# Patient Record
Sex: FEMALE | Race: WHITE | URBAN - METROPOLITAN AREA
[De-identification: names, ages, dates, MRNs, and addresses within clinical notes are randomized per-mention and may not be internally consistent; named-entity substitution may affect disease eponyms.]

---

## 2024-06-07 ENCOUNTER — OFFICE VISIT (OUTPATIENT)
Age: 24
End: 2024-06-07

## 2024-06-07 VITALS
SYSTOLIC BLOOD PRESSURE: 122 MMHG | DIASTOLIC BLOOD PRESSURE: 81 MMHG | HEART RATE: 71 BPM | HEIGHT: 64 IN | TEMPERATURE: 98.2 F | OXYGEN SATURATION: 98 %

## 2024-06-07 DIAGNOSIS — S81.811A LACERATION OF RIGHT LOWER EXTREMITY, INITIAL ENCOUNTER: Primary | ICD-10-CM
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
## 2024-06-07 RX ORDER — ESCITALOPRAM OXALATE 5 MG/1
5 TABLET ORAL DAILY
COMMUNITY

## 2024-06-07 RX ORDER — AMOXICILLIN AND CLAVULANATE POTASSIUM 875; 125 MG/1; MG/1
1 TABLET, FILM COATED ORAL 2 TIMES DAILY
Qty: 14 TABLET | Refills: 0 | Status: SHIPPED | OUTPATIENT
Start: 2024-06-07 | End: 2024-06-14

## 2024-06-07 ASSESSMENT — ENCOUNTER SYMPTOMS
SHORTNESS OF BREATH: 0
CONSTIPATION: 0
ABDOMINAL PAIN: 0
NAUSEA: 0
DIARRHEA: 0
CHEST TIGHTNESS: 0
VOMITING: 0
COUGH: 0

## 2024-06-07 NOTE — PROGRESS NOTES
Kaia Hickman (:  2000) is a 23 y.o. female,New patient, here for evaluation of the following chief complaint(s):  Animal Bite (Dog bit today 4 hours, dog bit right thigh and wouldn't let go)      ASSESSMENT/PLAN:    ICD-10-CM    1. Laceration of right lower extremity, initial encounter  S81.811A amoxicillin-clavulanate (AUGMENTIN) 875-125 MG per tablet      2. Dog bite, initial encounter  W54.0XXA Tdap, BOOSTRIX, (age 10 yrs+), IM     amoxicillin-clavulanate (AUGMENTIN) 875-125 MG per tablet          Patient presents for dog bite  Tetanus was updated here.   Wound was copiously irrigated with soap and water and then normal saline.   Applied nonadherent dressing and educated on signs of infection.   RX amoxicillin.     SUBJECTIVE/OBJECTIVE:    History provided by:  Patient   used: No      HPI:   23 y.o. female presents for a dog bite. She states that she was at work today when a dog bit her. Does not remember last tetanus. She states that she is able to contact dog owners to verify immunizations of the dog. She states that she did call EMS who advised to keep the area clean. She states that she went home took a shower and tried to irrigate the area in the shower but had continued bleeding and came here for evaluation. Does not believe she is pregnant. Is not on blood thinners.       Vitals:    24 1809   BP: 122/81   Site: Right Upper Arm   Position: Sitting   Cuff Size: Large Adult   Pulse: 71   Temp: 98.2 °F (36.8 °C)   TempSrc: Oral   SpO2: 98%   Height: 1.626 m (5' 4\")       Review of Systems   Constitutional:  Negative for fatigue and fever.   Respiratory:  Negative for cough, chest tightness and shortness of breath.    Cardiovascular:  Negative for chest pain.   Gastrointestinal:  Negative for abdominal pain, constipation, diarrhea, nausea and vomiting.   Musculoskeletal:  Negative for neck pain and neck stiffness.   Skin:  Positive for wound.       Physical Exam  Vitals and

## 2024-06-10 ENCOUNTER — HOSPITAL ENCOUNTER (EMERGENCY)
Age: 24
Discharge: HOME | End: 2024-06-11
Payer: COMMERCIAL

## 2024-06-10 DIAGNOSIS — S71.151D: Primary | ICD-10-CM
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
## 2024-06-10 PROCEDURE — 99282 EMERGENCY DEPT VISIT SF MDM: CPT

## 2024-06-10 ASSESSMENT — PAIN SCALES - GENERAL: PAINLEVEL_OUTOF10: 0

## 2024-06-11 VITALS
RESPIRATION RATE: 19 BRPM | BODY MASS INDEX: 30.27 KG/M2 | TEMPERATURE: 97.9 F | WEIGHT: 176.37 LBS | SYSTOLIC BLOOD PRESSURE: 110 MMHG | OXYGEN SATURATION: 99 % | DIASTOLIC BLOOD PRESSURE: 72 MMHG | HEART RATE: 76 BPM

## 2024-06-11 ASSESSMENT — PAIN SCALES - GENERAL: PAINLEVEL_OUTOF10: 0

## 2024-06-11 NOTE — DISCHARGE INSTRUCTIONS
Okay to do warm compresses at this time, may use Neosporin and bandaging if tolerated.  Continue to monitor for improvement.  Follow-up outpatient with occupational health provider for further care and treatment if needed.  Return to the ER for any emergency worsening or concern.

## 2024-06-11 NOTE — ED PROVIDER NOTES
**ADVANCED PRACTICE PROVIDER, I HAVE EVALUATED THIS PATIENT**        [facility] EMERGENCY DEPARTMENT  EMERGENCY DEPARTMENT ENCOUNTER      Pt Name: Kaia Hickman  MRN:[id number]  Birthdate 2000  Date of evaluation: 6/10/2024  Provider: Murali Lobo PA-C  Note Started: 12:34 AM EDT 6/11/24        Chief Complaint:    Chief Complaint   Patient presents with    Animal Bite     Pt reports getting bit by dog \"Friday\" causing laceration to right thigh. Pt states that she went to urgent care and was given a tetanus shot and abx. Pt endorses numbness to lower bite \"since Saturday\". Pt alert and oriented at this time with no signs of distress noted. Pt denies any pain at rest.         Nursing Notes, Past Medical Hx, Past Surgical Hx, Social Hx, Allergies, and Family Hx were all reviewed and agreed with or any disagreements were addressed in the HPI.    HPI: (Location, Duration, Timing, Severity, Quality, Assoc Sx, Context, Modifying factors)    History From: Patient          Chief Complaint of dog bite last week on right thigh    This is a  23 y.o. female who presents indicating that she was attacked by a dog while doing her job as a  last week.  She suffered a wound areas to the anterior proximal right thigh as well as the medial posterior aspect of the right thigh.  States that she did get care for this last week on the seventh when it occurred.  She has been on antibiotics since.  Seems to be healing well.  However she still has quite a bit of feeling of fullness in the skin around the wounds as well as bruising.  Also it feels numb where the punctures are as opposed to hurting like she thought that it probably should.  Finally decided this evening that she should come in for further care and treatment to the ER.    PastMedical/Surgical History:  History reviewed. No pertinent past medical history.  History reviewed. No pertinent surgical history.    Medications:  Previous Medications

## 2024-06-19 ENCOUNTER — HOSPITAL ENCOUNTER (EMERGENCY)
Age: 24
Discharge: HOME | End: 2024-06-19
Attending: STUDENT IN AN ORGANIZED HEALTH CARE EDUCATION/TRAINING PROGRAM
Payer: COMMERCIAL

## 2024-06-19 ENCOUNTER — APPOINTMENT (OUTPATIENT)
Dept: GENERAL RADIOLOGY | Age: 24
End: 2024-06-19
Payer: COMMERCIAL

## 2024-06-19 VITALS
RESPIRATION RATE: 16 BRPM | TEMPERATURE: 98.8 F | OXYGEN SATURATION: 98 % | WEIGHT: 170.42 LBS | HEART RATE: 75 BPM | BODY MASS INDEX: 29.09 KG/M2 | HEIGHT: 64 IN | SYSTOLIC BLOOD PRESSURE: 95 MMHG | DIASTOLIC BLOOD PRESSURE: 63 MMHG

## 2024-06-19 DIAGNOSIS — R50.9 FEVER, UNSPECIFIED FEVER CAUSE: Primary | ICD-10-CM

## 2024-06-19 DIAGNOSIS — R51.9 NONINTRACTABLE HEADACHE, UNSPECIFIED CHRONICITY PATTERN, UNSPECIFIED HEADACHE TYPE: ICD-10-CM

## 2024-06-19 LAB
ALBUMIN SERPL-MCNC: 4.3 G/DL (ref 3.4–5)
ALBUMIN/GLOB SERPL: 1.4 {RATIO} (ref 1.1–2.2)
ALP SERPL-CCNC: 70 U/L (ref 40–129)
ALT SERPL-CCNC: 22 U/L (ref 10–40)
ANION GAP SERPL CALCULATED.3IONS-SCNC: 11 MMOL/L (ref 3–16)
AST SERPL-CCNC: 21 U/L (ref 15–37)
BASOPHILS # BLD: 0 K/UL (ref 0–0.2)
BASOPHILS NFR BLD: 0.5 %
BILIRUB SERPL-MCNC: 0.3 MG/DL (ref 0–1)
BILIRUB UR QL STRIP.AUTO: NEGATIVE
BUN SERPL-MCNC: 8 MG/DL (ref 7–20)
CALCIUM SERPL-MCNC: 8.4 MG/DL (ref 8.3–10.6)
CHLORIDE SERPL-SCNC: 101 MMOL/L (ref 99–110)
CLARITY UR: CLEAR
CO2 SERPL-SCNC: 23 MMOL/L (ref 21–32)
COLOR UR: YELLOW
CREAT SERPL-MCNC: 0.6 MG/DL (ref 0.6–1.1)
DEPRECATED RDW RBC AUTO: 13.6 % (ref 12.4–15.4)
EOSINOPHIL # BLD: 0 K/UL (ref 0–0.6)
EOSINOPHIL NFR BLD: 0.7 %
FLUAV RNA UPPER RESP QL NAA+PROBE: NEGATIVE
FLUBV AG NPH QL: NEGATIVE
GFR SERPLBLD CREATININE-BSD FMLA CKD-EPI: >90 ML/MIN/{1.73_M2}
GLUCOSE SERPL-MCNC: 74 MG/DL (ref 70–99)
GLUCOSE UR STRIP.AUTO-MCNC: NEGATIVE MG/DL
HCG SERPL QL: NEGATIVE
HCT VFR BLD AUTO: 37.3 % (ref 36–48)
HETEROPH AB BLD QL IA: NEGATIVE
HGB BLD-MCNC: 12.8 G/DL (ref 12–16)
HGB UR QL STRIP.AUTO: NEGATIVE
KETONES UR STRIP.AUTO-MCNC: NEGATIVE MG/DL
LACTATE BLDV-SCNC: 0.6 MMOL/L (ref 0.4–2)
LEUKOCYTE ESTERASE UR QL STRIP.AUTO: NEGATIVE
LYMPHOCYTES # BLD: 1 K/UL (ref 1–5.1)
LYMPHOCYTES NFR BLD: 17.2 %
MCH RBC QN AUTO: 31.6 PG (ref 26–34)
MCHC RBC AUTO-ENTMCNC: 34.2 G/DL (ref 31–36)
MCV RBC AUTO: 92.3 FL (ref 80–100)
MONOCYTES # BLD: 0.7 K/UL (ref 0–1.3)
MONOCYTES NFR BLD: 11.6 %
NEUTROPHILS # BLD: 4.3 K/UL (ref 1.7–7.7)
NEUTROPHILS NFR BLD: 70 %
NITRITE UR QL STRIP.AUTO: NEGATIVE
PH UR STRIP.AUTO: 7.5 [PH] (ref 5–8)
PLATELET # BLD AUTO: 262 K/UL (ref 135–450)
PMV BLD AUTO: 8.5 FL (ref 5–10.5)
POTASSIUM SERPL-SCNC: 3.8 MMOL/L (ref 3.5–5.1)
PROT SERPL-MCNC: 7.4 G/DL (ref 6.4–8.2)
PROT UR STRIP.AUTO-MCNC: NEGATIVE MG/DL
RBC # BLD AUTO: 4.04 M/UL (ref 4–5.2)
REPORT: NORMAL
RESP PATH DNA+RNA PNL NPH NAA+NON-PROBE: NORMAL
S PYO AG THROAT QL: NEGATIVE
SARS-COV-2 RDRP RESP QL NAA+PROBE: NOT DETECTED
SODIUM SERPL-SCNC: 135 MMOL/L (ref 136–145)
SP GR UR STRIP.AUTO: 1.01 (ref 1–1.03)
UA COMPLETE W REFLEX CULTURE PNL UR: NORMAL
UA DIPSTICK W REFLEX MICRO PNL UR: NORMAL
URN SPEC COLLECT METH UR: NORMAL
UROBILINOGEN UR STRIP-ACNC: 0.2 E.U./DL
WBC # BLD AUTO: 6.1 K/UL (ref 4–11)

## 2024-06-19 PROCEDURE — 85025 COMPLETE CBC W/AUTO DIFF WBC: CPT

## 2024-06-19 PROCEDURE — 81003 URINALYSIS AUTO W/O SCOPE: CPT

## 2024-06-19 PROCEDURE — 71045 X-RAY EXAM CHEST 1 VIEW: CPT

## 2024-06-19 PROCEDURE — 0202U NFCT DS 22 TRGT SARS-COV-2: CPT

## 2024-06-19 PROCEDURE — 99284 EMERGENCY DEPT VISIT MOD MDM: CPT

## 2024-06-19 PROCEDURE — 80053 COMPREHEN METABOLIC PANEL: CPT

## 2024-06-19 PROCEDURE — 83605 ASSAY OF LACTIC ACID: CPT

## 2024-06-19 PROCEDURE — 6370000000 HC RX 637 (ALT 250 FOR IP): Performed by: PHYSICIAN ASSISTANT

## 2024-06-19 PROCEDURE — 87635 SARS-COV-2 COVID-19 AMP PRB: CPT

## 2024-06-19 PROCEDURE — 86308 HETEROPHILE ANTIBODY SCREEN: CPT

## 2024-06-19 PROCEDURE — 87880 STREP A ASSAY W/OPTIC: CPT

## 2024-06-19 PROCEDURE — 87804 INFLUENZA ASSAY W/OPTIC: CPT

## 2024-06-19 PROCEDURE — 2580000003 HC RX 258: Performed by: PHYSICIAN ASSISTANT

## 2024-06-19 PROCEDURE — 87081 CULTURE SCREEN ONLY: CPT

## 2024-06-19 PROCEDURE — 84703 CHORIONIC GONADOTROPIN ASSAY: CPT

## 2024-06-19 RX ORDER — IBUPROFEN 600 MG/1
600 TABLET ORAL EVERY 6 HOURS PRN
Qty: 30 TABLET | Refills: 0 | Status: SHIPPED | OUTPATIENT
Start: 2024-06-19

## 2024-06-19 RX ORDER — ACETAMINOPHEN 500 MG
1000 TABLET ORAL
Status: COMPLETED | OUTPATIENT
Start: 2024-06-19 | End: 2024-06-19

## 2024-06-19 RX ORDER — ACETAMINOPHEN 325 MG/1
650 TABLET ORAL EVERY 6 HOURS PRN
Qty: 30 TABLET | Refills: 0 | Status: SHIPPED | OUTPATIENT
Start: 2024-06-19

## 2024-06-19 RX ORDER — SODIUM CHLORIDE, SODIUM LACTATE, POTASSIUM CHLORIDE, AND CALCIUM CHLORIDE .6; .31; .03; .02 G/100ML; G/100ML; G/100ML; G/100ML
1000 INJECTION, SOLUTION INTRAVENOUS ONCE
Status: COMPLETED | OUTPATIENT
Start: 2024-06-19 | End: 2024-06-19

## 2024-06-19 RX ADMIN — SODIUM CHLORIDE, POTASSIUM CHLORIDE, SODIUM LACTATE AND CALCIUM CHLORIDE 1000 ML: 600; 310; 30; 20 INJECTION, SOLUTION INTRAVENOUS at 11:16

## 2024-06-19 RX ADMIN — ACETAMINOPHEN 1000 MG: 500 TABLET ORAL at 11:09

## 2024-06-19 ASSESSMENT — LIFESTYLE VARIABLES
HOW MANY STANDARD DRINKS CONTAINING ALCOHOL DO YOU HAVE ON A TYPICAL DAY: 1 OR 2
HOW OFTEN DO YOU HAVE A DRINK CONTAINING ALCOHOL: MONTHLY OR LESS

## 2024-06-19 ASSESSMENT — PAIN SCALES - GENERAL
PAINLEVEL_OUTOF10: 4
PAINLEVEL_OUTOF10: 7

## 2024-06-19 ASSESSMENT — PAIN DESCRIPTION - DESCRIPTORS: DESCRIPTORS: BURNING;SHARP

## 2024-06-19 ASSESSMENT — PAIN DESCRIPTION - LOCATION
LOCATION: HEAD
LOCATION: HEAD

## 2024-06-19 ASSESSMENT — PAIN - FUNCTIONAL ASSESSMENT: PAIN_FUNCTIONAL_ASSESSMENT: 0-10

## 2024-06-19 NOTE — ED PROVIDER NOTES
[facility] EMERGENCY DEPARTMENT  EMERGENCY DEPARTMENT ENCOUNTER        Pt Name: Kaia Hickman  MRN: [id number]  Birthdate 2000  Date of evaluation: 6/19/2024  Provider: Alfredo Mcclain PA-C  PCP: No primary care provider on file.  Note Started: 10:44 AM EDT 6/19/24       I have seen and evaluated this patient with my supervising physician No att. providers found.      CHIEF COMPLAINT       Chief Complaint   Patient presents with    Animal Bite     Pt to ED from home c/o dog bite to upper right thigh that occurred on 6/7/24. Pt states \"I started to develop a fever yesterday and my throat and head feel like it is on fire. I also was having chills last night too\" pt rates pain in head 4/10       HISTORY OF PRESENT ILLNESS: 1 or more Elements     History From: pt    Kaia Hickman is a 23 y.o. female who presents complaining of fever, sore throat, headache since yesterday.  Patient is concerned because she had a dog bite to the right thigh on 6/7.  She is a , it was a domesticated dog, states dog is being quarantined/observed.  She is not heard any thing from the quarantine, tried to contact them without return call.  Unsure of dog's vaccination status.  Wound on the thigh is well-healing.  She reports last night started having sore throat, headache, subjective fever.  Denies sick contacts, cough, vomiting, rash, muscle aches, weakness, paresthesia, stiffness.    Nursing Notes were all reviewed and agreed with or any disagreements were addressed in the HPI.    REVIEW OF SYSTEMS :      Review of Systems   All other systems reviewed and are negative.      Positives and Pertinent negatives as per HPI.       PAST MEDICAL HISTORY    has no past medical history on file.     SURGICAL HISTORY   History reviewed. No pertinent surgical history.    CURRENTMEDICATIONS       Discharge Medication List as of 6/19/2024  1:54 PM        CONTINUE these medications which have NOT CHANGED

## 2024-06-19 NOTE — ED PROVIDER NOTES
This is my YAMILA Supervisory and shared visit note:     This patient was seen by the advanced practice provider.     I personally saw the patient and made/approved the management plan and take responsibility for the patient management.      Briefly, 23 y.o. female presents with headache and fever.  Patient states she has also had slight nausea and diarrhea over the last couple days.  Patient denies any sick contacts.  Patient states she had a fiery headache that started this morning.  Of note patient was bit by dog 2 weeks ago.  Dog was quarantined and she has not been notified of any symptoms from [facility].  Also the dog was not a stray and has an older.  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  Patient was given a course of antibiotics, given tetanus vaccination.  Patient has been seen in urgent care for symptoms.      Focused exam:   Gen: awake, alert, and NAD  HEENT: NCAT. EOMI.   CV: RRR w/o MRG  Neck: No meningismus, no nuchal rigidity  Lungs: CTAB. No incr WOB.   Abdomen: Soft, nontender, nondistended. No rebound/guarding.   Neuro: Moving all extremities, fluent speech, follows commands     MDM:   Patient is hemodynamic stable upon arrival to the emergency department.  Patient has low-grade fever at 38.1, satting 98% on room air, heart rate 76.  On physical exam patient has no focal neurodeficits.  Patient has no nuchal rigidity.  Differential diagnosis includes but not limited to viral illness, strep throat, mono, viral pharyngitis, meningitis.  Although on differential there is low suspicion for meningitis.  Patient has no nuchal rigidity or meningismus.  Patient is alert and oriented x 3 without any of symptoms.  On physical examination patient's lungs are clear to auscultation bilaterally.  Low suspicion for pneumonia.  Patient's symptoms could be a combination of viral illness given she reports nausea, diarrhea, headache, and fever.  As a pertains to

## 2024-06-19 NOTE — DISCHARGE INSTRUCTIONS
Please follow-up with your primary care doctor.  If you continue to have headache or develop nausea vomiting with persistent fever please come back to the emergency department. Your labs today are unremarkable, no signs of elevated white count or or elevated white blood cell count.  Electrolytes are within normal limits.  Respiratory panel is pending.  COVID and influenza negative.  Strep throat negative.  Mononucleosis screening negative.

## 2024-06-21 ENCOUNTER — APPOINTMENT (OUTPATIENT)
Dept: GENERAL RADIOLOGY | Age: 24
End: 2024-06-21
Payer: COMMERCIAL

## 2024-06-21 ENCOUNTER — APPOINTMENT (OUTPATIENT)
Dept: CT IMAGING | Age: 24
End: 2024-06-21
Payer: COMMERCIAL

## 2024-06-21 ENCOUNTER — HOSPITAL ENCOUNTER (INPATIENT)
Age: 24
LOS: 1 days | Discharge: HOME | End: 2024-06-22
Attending: EMERGENCY MEDICINE | Admitting: HOSPITALIST
Payer: COMMERCIAL

## 2024-06-21 DIAGNOSIS — Z78.9 FAILURE OF OUTPATIENT TREATMENT: Primary | ICD-10-CM
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
## 2024-06-21 PROBLEM — M79.651 PAIN OF RIGHT THIGH: Status: ACTIVE | Noted: 2024-06-21

## 2024-06-21 PROBLEM — S71.151A: Status: ACTIVE | Noted: 2024-06-21

## 2024-06-21 PROBLEM — R79.82 CRP ELEVATED: Status: ACTIVE | Noted: 2024-06-21

## 2024-06-21 PROBLEM — L03.115 CELLULITIS OF RIGHT THIGH: Status: ACTIVE | Noted: 2024-06-21
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
## 2024-06-21 PROBLEM — L08.9: Status: ACTIVE | Noted: 2024-06-21
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
## 2024-06-21 PROBLEM — R50.9 FEVER: Status: ACTIVE | Noted: 2024-06-21

## 2024-06-21 LAB
ALBUMIN SERPL-MCNC: 3.8 G/DL (ref 3.4–5)
ALBUMIN/GLOB SERPL: 1.3 {RATIO} (ref 1.1–2.2)
ALP SERPL-CCNC: 64 U/L (ref 40–129)
ALT SERPL-CCNC: 17 U/L (ref 10–40)
ANION GAP SERPL CALCULATED.3IONS-SCNC: 13 MMOL/L (ref 3–16)
AST SERPL-CCNC: 15 U/L (ref 15–37)
BACTERIA URNS QL MICRO: ABNORMAL /HPF
BASOPHILS # BLD: 0 K/UL (ref 0–0.2)
BASOPHILS NFR BLD: 0.7 %
BILIRUB SERPL-MCNC: <0.2 MG/DL (ref 0–1)
BILIRUB UR QL STRIP.AUTO: NEGATIVE
BUN SERPL-MCNC: 12 MG/DL (ref 7–20)
CALCIUM SERPL-MCNC: 8.1 MG/DL (ref 8.3–10.6)
CHLORIDE SERPL-SCNC: 106 MMOL/L (ref 99–110)
CLARITY UR: CLEAR
CO2 SERPL-SCNC: 20 MMOL/L (ref 21–32)
COLOR UR: YELLOW
CREAT SERPL-MCNC: 0.6 MG/DL (ref 0.6–1.1)
CRP SERPL-MCNC: 16.7 MG/L (ref 0–5.1)
DEPRECATED RDW RBC AUTO: 13.4 % (ref 12.4–15.4)
EOSINOPHIL # BLD: 0.2 K/UL (ref 0–0.6)
EOSINOPHIL NFR BLD: 3 %
EPI CELLS #/AREA URNS AUTO: 3 /HPF (ref 0–5)
ERYTHROCYTE [SEDIMENTATION RATE] IN BLOOD BY WESTERGREN METHOD: 14 MM/HR (ref 0–20)
FLUAV RNA UPPER RESP QL NAA+PROBE: NEGATIVE
FLUBV AG NPH QL: NEGATIVE
GFR SERPLBLD CREATININE-BSD FMLA CKD-EPI: >90 ML/MIN/{1.73_M2}
GLUCOSE SERPL-MCNC: 99 MG/DL (ref 70–99)
GLUCOSE UR STRIP.AUTO-MCNC: NEGATIVE MG/DL
HCG SERPL QL: NEGATIVE
HCT VFR BLD AUTO: 32.7 % (ref 36–48)
HGB BLD-MCNC: 11 G/DL (ref 12–16)
HGB UR QL STRIP.AUTO: ABNORMAL
HYALINE CASTS #/AREA URNS AUTO: 0 /LPF (ref 0–8)
KETONES UR STRIP.AUTO-MCNC: NEGATIVE MG/DL
LACTATE BLDV-SCNC: 0.3 MMOL/L (ref 0.4–1.9)
LACTATE BLDV-SCNC: 0.6 MMOL/L (ref 0.4–1.9)
LEUKOCYTE ESTERASE UR QL STRIP.AUTO: ABNORMAL
LYMPHOCYTES # BLD: 1.5 K/UL (ref 1–5.1)
LYMPHOCYTES NFR BLD: 22.7 %
MCH RBC QN AUTO: 31.2 PG (ref 26–34)
MCHC RBC AUTO-ENTMCNC: 33.8 G/DL (ref 31–36)
MCV RBC AUTO: 92.5 FL (ref 80–100)
MONOCYTES # BLD: 0.5 K/UL (ref 0–1.3)
MONOCYTES NFR BLD: 7.9 %
NEUTROPHILS # BLD: 4.5 K/UL (ref 1.7–7.7)
NEUTROPHILS NFR BLD: 65.7 %
NITRITE UR QL STRIP.AUTO: NEGATIVE
PH UR STRIP.AUTO: 6 [PH] (ref 5–8)
PLATELET # BLD AUTO: 251 K/UL (ref 135–450)
PMV BLD AUTO: 8.9 FL (ref 5–10.5)
POTASSIUM SERPL-SCNC: 4.1 MMOL/L (ref 3.5–5.1)
PROCALCITONIN SERPL IA-MCNC: 0.05 NG/ML (ref 0–0.15)
PROT SERPL-MCNC: 6.7 G/DL (ref 6.4–8.2)
PROT UR STRIP.AUTO-MCNC: NEGATIVE MG/DL
RBC # BLD AUTO: 3.54 M/UL (ref 4–5.2)
RBC CLUMPS #/AREA URNS AUTO: 319 /HPF (ref 0–4)
S PYO AG THROAT QL: NEGATIVE
S PYO THROAT QL CULT: NORMAL
SARS-COV-2 RDRP RESP QL NAA+PROBE: NOT DETECTED
SODIUM SERPL-SCNC: 139 MMOL/L (ref 136–145)
SP GR UR STRIP.AUTO: 1.02 (ref 1–1.03)
UA COMPLETE W REFLEX CULTURE PNL UR: ABNORMAL
UA DIPSTICK W REFLEX MICRO PNL UR: YES
URN SPEC COLLECT METH UR: ABNORMAL
UROBILINOGEN UR STRIP-ACNC: 0.2 E.U./DL
WBC # BLD AUTO: 6.8 K/UL (ref 4–11)
WBC #/AREA URNS AUTO: 7 /HPF (ref 0–5)

## 2024-06-21 PROCEDURE — 87635 SARS-COV-2 COVID-19 AMP PRB: CPT

## 2024-06-21 PROCEDURE — 6360000002 HC RX W HCPCS: Performed by: HOSPITALIST

## 2024-06-21 PROCEDURE — 6370000000 HC RX 637 (ALT 250 FOR IP): Performed by: EMERGENCY MEDICINE

## 2024-06-21 PROCEDURE — 6370000000 HC RX 637 (ALT 250 FOR IP): Performed by: HOSPITALIST

## 2024-06-21 PROCEDURE — 87040 BLOOD CULTURE FOR BACTERIA: CPT

## 2024-06-21 PROCEDURE — 85652 RBC SED RATE AUTOMATED: CPT

## 2024-06-21 PROCEDURE — APPSS15 APP SPLIT SHARED TIME 0-15 MINUTES: Performed by: PHYSICIAN ASSISTANT

## 2024-06-21 PROCEDURE — 2580000003 HC RX 258: Performed by: HOSPITALIST

## 2024-06-21 PROCEDURE — 36415 COLL VENOUS BLD VENIPUNCTURE: CPT

## 2024-06-21 PROCEDURE — APPNB15 APP NON BILLABLE TIME 0-15 MINS: Performed by: PHYSICIAN ASSISTANT

## 2024-06-21 PROCEDURE — 99222 1ST HOSP IP/OBS MODERATE 55: CPT | Performed by: SURGERY

## 2024-06-21 PROCEDURE — 81001 URINALYSIS AUTO W/SCOPE: CPT

## 2024-06-21 PROCEDURE — 87081 CULTURE SCREEN ONLY: CPT

## 2024-06-21 PROCEDURE — 6360000002 HC RX W HCPCS: Performed by: EMERGENCY MEDICINE

## 2024-06-21 PROCEDURE — 99285 EMERGENCY DEPT VISIT HI MDM: CPT

## 2024-06-21 PROCEDURE — 80053 COMPREHEN METABOLIC PANEL: CPT

## 2024-06-21 PROCEDURE — 2580000003 HC RX 258: Performed by: INTERNAL MEDICINE

## 2024-06-21 PROCEDURE — 1200000000 HC SEMI PRIVATE

## 2024-06-21 PROCEDURE — 6360000002 HC RX W HCPCS: Performed by: INTERNAL MEDICINE

## 2024-06-21 PROCEDURE — 83605 ASSAY OF LACTIC ACID: CPT

## 2024-06-21 PROCEDURE — 2500000003 HC RX 250 WO HCPCS: Performed by: HOSPITALIST

## 2024-06-21 PROCEDURE — 87804 INFLUENZA ASSAY W/OPTIC: CPT

## 2024-06-21 PROCEDURE — 2580000003 HC RX 258: Performed by: EMERGENCY MEDICINE

## 2024-06-21 PROCEDURE — 73700 CT LOWER EXTREMITY W/O DYE: CPT

## 2024-06-21 PROCEDURE — 84145 PROCALCITONIN (PCT): CPT

## 2024-06-21 PROCEDURE — 84703 CHORIONIC GONADOTROPIN ASSAY: CPT

## 2024-06-21 PROCEDURE — 85025 COMPLETE CBC W/AUTO DIFF WBC: CPT

## 2024-06-21 PROCEDURE — 87880 STREP A ASSAY W/OPTIC: CPT

## 2024-06-21 PROCEDURE — 86140 C-REACTIVE PROTEIN: CPT

## 2024-06-21 PROCEDURE — 99222 1ST HOSP IP/OBS MODERATE 55: CPT | Performed by: INTERNAL MEDICINE

## 2024-06-21 RX ORDER — POLYETHYLENE GLYCOL 3350 17 G/17G
17 POWDER, FOR SOLUTION ORAL DAILY PRN
Status: DISCONTINUED | OUTPATIENT
Start: 2024-06-21 | End: 2024-06-23 | Stop reason: HOSPADM

## 2024-06-21 RX ORDER — ACETAMINOPHEN 325 MG/1
650 TABLET ORAL ONCE
Status: COMPLETED | OUTPATIENT
Start: 2024-06-21 | End: 2024-06-21

## 2024-06-21 RX ORDER — SODIUM CHLORIDE 9 MG/ML
INJECTION, SOLUTION INTRAVENOUS PRN
Status: DISCONTINUED | OUTPATIENT
Start: 2024-06-21 | End: 2024-06-23 | Stop reason: HOSPADM

## 2024-06-21 RX ORDER — ONDANSETRON 4 MG/1
4 TABLET, ORALLY DISINTEGRATING ORAL EVERY 8 HOURS PRN
Status: DISCONTINUED | OUTPATIENT
Start: 2024-06-21 | End: 2024-06-23 | Stop reason: HOSPADM

## 2024-06-21 RX ORDER — SODIUM CHLORIDE 9 MG/ML
INJECTION, SOLUTION INTRAVENOUS CONTINUOUS
Status: DISCONTINUED | OUTPATIENT
Start: 2024-06-21 | End: 2024-06-23 | Stop reason: HOSPADM

## 2024-06-21 RX ORDER — IBUPROFEN 600 MG/1
600 TABLET ORAL EVERY 6 HOURS PRN
Status: DISCONTINUED | OUTPATIENT
Start: 2024-06-21 | End: 2024-06-23 | Stop reason: HOSPADM

## 2024-06-21 RX ORDER — ONDANSETRON 2 MG/ML
4 INJECTION INTRAMUSCULAR; INTRAVENOUS EVERY 6 HOURS PRN
Status: DISCONTINUED | OUTPATIENT
Start: 2024-06-21 | End: 2024-06-23 | Stop reason: HOSPADM

## 2024-06-21 RX ORDER — POTASSIUM CHLORIDE 20 MEQ/1
40 TABLET, EXTENDED RELEASE ORAL PRN
Status: DISCONTINUED | OUTPATIENT
Start: 2024-06-21 | End: 2024-06-23 | Stop reason: HOSPADM

## 2024-06-21 RX ORDER — ACETAMINOPHEN 325 MG/1
650 TABLET ORAL EVERY 6 HOURS PRN
Status: DISCONTINUED | OUTPATIENT
Start: 2024-06-21 | End: 2024-06-23 | Stop reason: HOSPADM

## 2024-06-21 RX ORDER — MAGNESIUM SULFATE IN WATER 40 MG/ML
2000 INJECTION, SOLUTION INTRAVENOUS PRN
Status: DISCONTINUED | OUTPATIENT
Start: 2024-06-21 | End: 2024-06-23 | Stop reason: HOSPADM

## 2024-06-21 RX ORDER — ACETAMINOPHEN 650 MG/1
650 SUPPOSITORY RECTAL EVERY 6 HOURS PRN
Status: DISCONTINUED | OUTPATIENT
Start: 2024-06-21 | End: 2024-06-23 | Stop reason: HOSPADM

## 2024-06-21 RX ORDER — SODIUM CHLORIDE 0.9 % (FLUSH) 0.9 %
5-40 SYRINGE (ML) INJECTION EVERY 12 HOURS SCHEDULED
Status: DISCONTINUED | OUTPATIENT
Start: 2024-06-21 | End: 2024-06-23 | Stop reason: HOSPADM

## 2024-06-21 RX ORDER — IBUPROFEN 400 MG/1
400 TABLET ORAL ONCE
Status: COMPLETED | OUTPATIENT
Start: 2024-06-21 | End: 2024-06-21

## 2024-06-21 RX ORDER — ALBUTEROL SULFATE 90 UG/1
2 AEROSOL, METERED RESPIRATORY (INHALATION) EVERY 6 HOURS PRN
COMMUNITY

## 2024-06-21 RX ORDER — POTASSIUM CHLORIDE 7.45 MG/ML
10 INJECTION INTRAVENOUS PRN
Status: DISCONTINUED | OUTPATIENT
Start: 2024-06-21 | End: 2024-06-23 | Stop reason: HOSPADM

## 2024-06-21 RX ORDER — SODIUM CHLORIDE 0.9 % (FLUSH) 0.9 %
5-40 SYRINGE (ML) INJECTION PRN
Status: DISCONTINUED | OUTPATIENT
Start: 2024-06-21 | End: 2024-06-23 | Stop reason: HOSPADM

## 2024-06-21 RX ORDER — ESCITALOPRAM OXALATE 10 MG/1
5 TABLET ORAL DAILY
Status: DISCONTINUED | OUTPATIENT
Start: 2024-06-21 | End: 2024-06-23 | Stop reason: HOSPADM

## 2024-06-21 RX ADMIN — PIPERACILLIN AND TAZOBACTAM 3375 MG: 3; .375 INJECTION, POWDER, LYOPHILIZED, FOR SOLUTION INTRAVENOUS at 17:36

## 2024-06-21 RX ADMIN — ACETAMINOPHEN 325MG 650 MG: 325 TABLET ORAL at 04:50

## 2024-06-21 RX ADMIN — ESCITALOPRAM OXALATE 5 MG: 10 TABLET ORAL at 10:01

## 2024-06-21 RX ADMIN — ONDANSETRON 4 MG: 4 TABLET, ORALLY DISINTEGRATING ORAL at 14:23

## 2024-06-21 RX ADMIN — ACETAMINOPHEN 325MG 650 MG: 325 TABLET ORAL at 14:23

## 2024-06-21 RX ADMIN — AMPICILLIN AND SULBACTAM 3000 MG: 2; 1 INJECTION, POWDER, FOR SOLUTION INTRAVENOUS at 08:15

## 2024-06-21 RX ADMIN — IBUPROFEN 400 MG: 400 TABLET, FILM COATED ORAL at 04:50

## 2024-06-21 RX ADMIN — DOXYCYCLINE 100 MG: 100 INJECTION, POWDER, LYOPHILIZED, FOR SOLUTION INTRAVENOUS at 14:16

## 2024-06-21 RX ADMIN — SODIUM CHLORIDE: 9 INJECTION, SOLUTION INTRAVENOUS at 10:02

## 2024-06-21 RX ADMIN — WATER 1000 MG: 1 INJECTION INTRAMUSCULAR; INTRAVENOUS; SUBCUTANEOUS at 14:12

## 2024-06-21 ASSESSMENT — LIFESTYLE VARIABLES
HOW MANY STANDARD DRINKS CONTAINING ALCOHOL DO YOU HAVE ON A TYPICAL DAY: 1 OR 2
HOW OFTEN DO YOU HAVE A DRINK CONTAINING ALCOHOL: 2-3 TIMES A WEEK

## 2024-06-21 ASSESSMENT — PAIN SCALES - GENERAL: PAINLEVEL_OUTOF10: 0

## 2024-06-21 NOTE — ED TRIAGE NOTES
Verified name and  prior to triage.    Patient here for on going fever 101 that started Tuesday. Fever temp relieved with ibuprofen, last taken at 1730. Nausea \"in waves\", Diarrhea past 24 hours. Headache, throat hurts. Patient was just seen here this week for same.

## 2024-06-21 NOTE — PLAN OF CARE
Patient was not seen or observed.  Admission orders were placed to facilitate throughput time at the emergency department.

## 2024-06-21 NOTE — H&P
Transportation (Medical): No     Lack of Transportation (Non-Medical): No   Housing Stability: Low Risk  (6/21/2024)    Housing Stability Vital Sign     Unable to Pay for Housing in the Last Year: No     Number of Places Lived in the Last Year: 1     Unstable Housing in the Last Year: No       Medications:   Medications:    escitalopram  5 mg Oral Daily    sodium chloride flush  5-40 mL IntraVENous 2 times per day    ampicillin-sulbactam  3,000 mg IntraVENous Q6H      Infusions:    sodium chloride      sodium chloride 75 mL/hr at 06/21/24 1002     PRN Meds: acetaminophen, 650 mg, Q6H PRN  ibuprofen, 600 mg, Q6H PRN  sodium chloride flush, 5-40 mL, PRN  sodium chloride, , PRN  potassium chloride, 40 mEq, PRN   Or  potassium alternative oral replacement, 40 mEq, PRN   Or  potassium chloride, 10 mEq, PRN  magnesium sulfate, 2,000 mg, PRN  ondansetron, 4 mg, Q8H PRN   Or  ondansetron, 4 mg, Q6H PRN  polyethylene glycol, 17 g, Daily PRN  acetaminophen, 650 mg, Q6H PRN   Or  acetaminophen, 650 mg, Q6H PRN        Labs      CBC:   Recent Labs     06/19/24  1112 06/21/24  0446   WBC 6.1 6.8   HGB 12.8 11.0*    251     BMP:    Recent Labs     06/19/24  1112 06/21/24  0446   * 139   K 3.8 4.1    106   CO2 23 20*   BUN 8 12   CREATININE 0.6 0.6   GLUCOSE 74 99     Hepatic:   Recent Labs     06/19/24  1112 06/21/24  0446   AST 21 15   ALT 22 17   BILITOT 0.3 <0.2   ALKPHOS 70 64     Lipids: No results found for: \"CHOL\", \"HDL\", \"TRIG\"  Hemoglobin A1C: No results found for: \"LABA1C\"  TSH: No results found for: \"TSH\"  Troponin: No results found for: \"TROPONINT\"  Lactic Acid:   Recent Labs     06/19/24 1112   LACTA 0.6     BNP: No results for input(s): \"PROBNP\" in the last 72 hours.  UA:  Lab Results   Component Value Date/Time    NITRU Negative 06/21/2024 04:46 AM    COLORU Yellow 06/21/2024 04:46 AM    PHUR 6.0 06/21/2024 04:46 AM    WBCUA 7 06/21/2024 04:46 AM    RBCUA 319 06/21/2024 04:46 AM    BACTERIA None

## 2024-06-21 NOTE — CONSULTS
Infectious Diseases Inpatient Consult Note      Reason for Consult:  Fevers from Tuesday and h/o Dog bite on 6/8/24     Requesting Physician:       Primary Care Physician:  No primary care provider on file.    History Obtained From:  Epic and pt     CHIEF COMPLAINT:     Chief Complaint   Patient presents with    Fever         HISTORY OF PRESENT ILLNESS:  23 y.o. 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  she was concerned for worsening infection and present to the hospital.  Labs from ED visit on 6/19/24 were all normal. She is now admitted from ED on  6/21/24 due to ongoing fever concern for right thigh swelling and she had picture from before indicate a big bruise and bite marks.  Labs on this admission creatinine 0.6 procalcitonin 0.05 LFT normal WBC normal ESR 14 blood culture in process rapid COVID-19 negative, Tmax 100.3.  Due to ongoing low-grade temperatures and symptoms were consulted for recommendations      Past Medical History:    History reviewed. No pertinent past medical history.    Past Surgical History:    History reviewed. No pertinent surgical history.    Current Medications:    Outpatient Medications Marked as Taking for the 6/21/24 encounter (Hospital Encounter)   Medication Sig Dispense Refill    albuterol sulfate HFA (VENTOLIN HFA) 108 (90 Base) MCG/ACT inhaler Inhale 2 puffs into the lungs every 6 hours as needed for Wheezing         Allergies:  Peanut-containing drug products    Immunizations :   Immunization History   Administered Date(s) Administered    TDaP, ADACEL (age 10y-64y), BOOSTRIX (age 10y+), IM, 0.5mL 06/07/2024         Social History:     Social History

## 2024-06-21 NOTE — CONSULTS
Surgery Consult Note     Mak Bobby PA-C  Pt Name: Kaia Hickman  MRN: [id number]  YOB: 2000  Date of evaluation: 6/21/2024  Primary Care Physician: No primary care provider on file.  Referred By: Redd Anna   Reason for Consultation: Abscess of right thigh from dog bite. Please consider incision and drainage   Chief Complaint: pain from dog bite  IMPRESSIONS:   Right lower proximal extremity dog bite. +induration. No fluctuance. No drainage. No sign of associated abscess  WBC count WNL: 6.8  PLANS:   Monitor and control pain  IV antibiotics  Regular diet as tolerated  No general surgery needs at this time  SUBJECTIVE:   History of Chief Complaint:    Kaia Hickman is a 23 y.o. female who we were asked to evaluate for an abscess from a dog bite. She stated that she was bit by a dog 2 weeks ago on her upper right thigh. She was treated as an outpatient but still had fevers and came back to the ER to be evaluated. At this time there is induration at the site but no fluctuance, or drainage. No signs of associated abscess. She admits to having fevers and associated nausea and emesis.   Past Medical History  Reviewed  has no past medical history on file.  Past Surgical History  Reviewed has no past surgical history on file.  Medications  Prior to Admission medications    Medication Sig Start Date End Date Taking? Authorizing Provider   albuterol sulfate HFA (VENTOLIN HFA) 108 (90 Base) MCG/ACT inhaler Inhale 2 puffs into the lungs every 6 hours as needed for Wheezing   Yes Terrence Barrow MD   acetaminophen (TYLENOL) 325 MG tablet Take 2 tablets by mouth every 6 hours as needed for Pain 6/19/24   Berto Alvarez MD   ibuprofen (ADVIL;MOTRIN) 600 MG tablet Take 1 tablet by mouth every 6 hours as needed for Pain 6/19/24   Berto Alvarez MD   escitalopram (LEXAPRO) 10 MG tablet Take 1 tablet by mouth daily    ProviderTerrence MD    Scheduled Meds:   escitalopram  5 mg Oral Daily     for: \"CBLOOD\", \"CFUNGUSBL\"    Thank you for the interesting evaluation. Further recommendations to follow.    Mak Hi PA-C  General and Vascular Surgery [phone]  Electronically signed by Mak Bobby PA-C on 6/21/2024 at 10:54 AM    Agree with above note.  The patient was personally seen and examined.  Kaia Hickman is a 24 yo female who was bitten by a dog 2 weeks ago in the right thigh who presents with a 3 day history of fevers.  Overall she has been doing better with regards to her right thigh.  She completed a 10 day course of augmentin, but then started to have fevers after the antibiotics stopped.  Her wound are healing well and redness and bruising is improving.  She states that she has some firmness between the top and bottom puncture sites still.    NAD, alert and oriented  Normal respiratory effort, no accessory muscle use  RRR  Ext Right thigh with two anterior puncture sites with eschar, some induration between without significant erythema, no fluctuance, no drainage; right medical thigh with two puncture sites with minimal induration, no erythema or drainage    WBC 6.8  Cr 0.6    CT right femur personally reviewed, showing induration of thigh anteriorly and medially correlating with bite areas, without obvious fluid collection to warrant I&D    A/P: 24 yo female with fevers after right thigh dog bite    Expected induration from soft tissue trauma and resolving infection  Continue antibiotics per ID  Continue local wound care  No plans for surgical intervention at this time.  CT imaging final read pending    Primitivo Allison MD

## 2024-06-21 NOTE — ED NOTES
ED SBAR report provider to RUSH parker. Patient to be transported to Room 3126 via stretcher by transport tech.Patient transported with bedside cardiac monitor and with IV medications infusing. IV site clean, dry, and intact. MEWS score and pain assessed as 0 and documented. Updated patient and family on plan of care.

## 2024-06-21 NOTE — ED PROVIDER NOTES
mg (650 mg Oral Given 6/21/24 0450)   ibuprofen (ADVIL;MOTRIN) tablet 400 mg (400 mg Oral Given 6/21/24 0450)            I am the Primary Clinician of Record.    FINAL IMPRESSION      1. Failure of outpatient treatment    2. Dog bite, subsequent encounter          DISPOSITION/PLAN     DISPOSITION Decision To Admit 06/21/2024 06:20:57 AM      PATIENT REFERRED TO:  No follow-up provider specified.    DISCHARGE MEDICATIONS:  Patient was given scripts for the following medications. I counseled patient how to take these medications:  New Prescriptions    No medications on file       DISCONTINUED MEDICATIONS:  Discontinued Medications    No medications on file       Pt was seen during the COVID 19 pandemic. Appropriate PPE worn by ME during patient encounters. Patient was cared for during a time with constrained hospital bed capacity with nationwide stress on resources and staffing.      (This chart was generated in part by using Dragon Dictation system and may contain errors related to that system including errors in grammar, punctuation, and spelling, as well as words and phrases that may be inappropriate. If there are any questions or concerns please feel free to contact the dictating provider for clarification.)          Yenifer Casarez MD  06/22/24 7842

## 2024-06-22 VITALS
SYSTOLIC BLOOD PRESSURE: 97 MMHG | BODY MASS INDEX: 29.06 KG/M2 | RESPIRATION RATE: 19 BRPM | HEART RATE: 84 BPM | OXYGEN SATURATION: 96 % | DIASTOLIC BLOOD PRESSURE: 57 MMHG | TEMPERATURE: 98.6 F | HEIGHT: 64 IN | WEIGHT: 170.19 LBS

## 2024-06-22 LAB
ANION GAP SERPL CALCULATED.3IONS-SCNC: 9 MMOL/L (ref 3–16)
BACTERIA BLD CULT ORG #2: NORMAL
BACTERIA BLD CULT: NORMAL
BASOPHILS # BLD: 0 K/UL (ref 0–0.2)
BASOPHILS NFR BLD: 0.5 %
BUN SERPL-MCNC: 11 MG/DL (ref 7–20)
CALCIUM SERPL-MCNC: 8 MG/DL (ref 8.3–10.6)
CHLORIDE SERPL-SCNC: 103 MMOL/L (ref 99–110)
CO2 SERPL-SCNC: 23 MMOL/L (ref 21–32)
CREAT SERPL-MCNC: 0.7 MG/DL (ref 0.6–1.1)
DEPRECATED RDW RBC AUTO: 13.3 % (ref 12.4–15.4)
EOSINOPHIL # BLD: 0.2 K/UL (ref 0–0.6)
EOSINOPHIL NFR BLD: 4.3 %
GFR SERPLBLD CREATININE-BSD FMLA CKD-EPI: >90 ML/MIN/{1.73_M2}
GLUCOSE SERPL-MCNC: 94 MG/DL (ref 70–99)
HCT VFR BLD AUTO: 32.7 % (ref 36–48)
HGB BLD-MCNC: 11.1 G/DL (ref 12–16)
LYMPHOCYTES # BLD: 2.3 K/UL (ref 1–5.1)
LYMPHOCYTES NFR BLD: 44.3 %
MCH RBC QN AUTO: 31.3 PG (ref 26–34)
MCHC RBC AUTO-ENTMCNC: 34.1 G/DL (ref 31–36)
MCV RBC AUTO: 91.8 FL (ref 80–100)
MONOCYTES # BLD: 0.4 K/UL (ref 0–1.3)
MONOCYTES NFR BLD: 7.1 %
NEUTROPHILS # BLD: 2.3 K/UL (ref 1.7–7.7)
NEUTROPHILS NFR BLD: 43.8 %
PLATELET # BLD AUTO: 260 K/UL (ref 135–450)
PMV BLD AUTO: 9.2 FL (ref 5–10.5)
POTASSIUM SERPL-SCNC: 4.6 MMOL/L (ref 3.5–5.1)
RBC # BLD AUTO: 3.56 M/UL (ref 4–5.2)
SODIUM SERPL-SCNC: 135 MMOL/L (ref 136–145)
WBC # BLD AUTO: 5.2 K/UL (ref 4–11)

## 2024-06-22 PROCEDURE — 94760 N-INVAS EAR/PLS OXIMETRY 1: CPT

## 2024-06-22 PROCEDURE — 80048 BASIC METABOLIC PNL TOTAL CA: CPT

## 2024-06-22 PROCEDURE — 6370000000 HC RX 637 (ALT 250 FOR IP): Performed by: HOSPITALIST

## 2024-06-22 PROCEDURE — 6360000002 HC RX W HCPCS: Performed by: INTERNAL MEDICINE

## 2024-06-22 PROCEDURE — 99231 SBSQ HOSP IP/OBS SF/LOW 25: CPT | Performed by: SURGERY

## 2024-06-22 PROCEDURE — 2580000003 HC RX 258: Performed by: HOSPITALIST

## 2024-06-22 PROCEDURE — 2580000003 HC RX 258: Performed by: INTERNAL MEDICINE

## 2024-06-22 PROCEDURE — 85025 COMPLETE CBC W/AUTO DIFF WBC: CPT

## 2024-06-22 PROCEDURE — 36415 COLL VENOUS BLD VENIPUNCTURE: CPT

## 2024-06-22 RX ORDER — AMOXICILLIN AND CLAVULANATE POTASSIUM 875; 125 MG/1; MG/1
1 TABLET, FILM COATED ORAL 2 TIMES DAILY
Qty: 14 TABLET | Refills: 0 | Status: SHIPPED | OUTPATIENT
Start: 2024-06-22 | End: 2024-06-29

## 2024-06-22 RX ADMIN — PIPERACILLIN AND TAZOBACTAM 3375 MG: 3; .375 INJECTION, POWDER, LYOPHILIZED, FOR SOLUTION INTRAVENOUS at 00:03

## 2024-06-22 RX ADMIN — PIPERACILLIN AND TAZOBACTAM 3375 MG: 3; .375 INJECTION, POWDER, LYOPHILIZED, FOR SOLUTION INTRAVENOUS at 09:10

## 2024-06-22 RX ADMIN — SODIUM CHLORIDE: 9 INJECTION, SOLUTION INTRAVENOUS at 09:00

## 2024-06-22 RX ADMIN — ESCITALOPRAM OXALATE 5 MG: 10 TABLET ORAL at 09:22

## 2024-06-22 NOTE — PROGRESS NOTES
General Surgery  Daily Progress Note    Pt Name: Kaia Hickman  Medical Record Number: [id number]  Date of Birth 2000   Today's Date: 6/22/2024    Chief Complaint   Patient presents with    Fever       ASSESSMENT/PLAN  Dog bite of right thigh - CT imaging without fluid collection.  On IV zosyn.  Induration improved today.  Afebrile x 24 hours.  OK for discharge from surgical standpoint.  Follow up with me as needed.  Will sign off, please call with questions.       SUBJECTIVE  Kaia has improved from yesterday. Pain is well controlled. Current activity is ad john    OBJECTIVE  VITALS:  height is 1.626 m (5' 4\") and weight is 77.2 kg (170 lb 3.1 oz). Her oral temperature is 98.6 °F (37 °C). Her blood pressure is 97/57 (abnormal) and her pulse is 84. Her respiration is 19 and oxygen saturation is 96%.   GENERAL: alert, no distress  LUNGS: normal respiratory effort, no accessory muscle use  EXTREMITY: right anterior thigh wound with improved induration between puncture sites, no fluctuance or drainage; medial thigh with minimal induration and no erythema  I/O last 3 completed shifts:  In: 1440 [P.O.:1440]  Out: -   No intake/output data recorded.    LABS  Recent Labs     06/21/24  0446 06/22/24  0435   WBC 6.8 5.2   HGB 11.0* 11.1*   HCT 32.7* 32.7*    260    135*   K 4.1 4.6    103   CO2 20* 23   BUN 12 11   CREATININE 0.6 0.7   CALCIUM 8.1* 8.0*   AST 15  --    ALT 17  --    BILITOT <0.2  --    NITRU Negative  --    COLORU Yellow  --    BACTERIA None Seen  --    CBC with Differential:    Lab Results   Component Value Date/Time    WBC 5.2 06/22/2024 04:35 AM    RBC 3.56 06/22/2024 04:35 AM    HGB 11.1 06/22/2024 04:35 AM    HCT 32.7 06/22/2024 04:35 AM     06/22/2024 04:35 AM    MCV 91.8 06/22/2024 04:35 AM    MCH 31.3 06/22/2024 04:35 AM    MCHC 34.1 06/22/2024 04:35 AM    RDW 13.3 06/22/2024 04:35 AM    LYMPHOPCT 44.3 06/22/2024 04:35 AM    MONOPCT 7.1 06/22/2024 04:35 AM    
Bedside introduction complete. Patient denies any needs at this time. Updated whiteboard with RN and PCA name and number. Patient able to make needs known, using call light appropriately. Will continue to monitor and assess for needs and comfort.     
Data- discharge order received, pt verbalized agreement to discharge, disposition to previous residence, no needs for HHC/DME.     Action- discharge instructions prepared and given to pt , pt verbalized understanding. Medication information packet given r/t NEW and/or CHANGED prescriptions emphasizing name/purpose/side effects, pt verbalized understanding. Discharge instruction summary: Diet- regular , Activity- up as tolerated , Primary Care Physician as follows:  Encouraged to follow-up with PCP at [facility] for appointment , immunizations reviewed and up to date , prescription medications filled at [facility] retail pharmacy . Inpatient treatment reviewed.     Response- Pt belongings gathered, IV removed. Disposition is home (no HHC/DME needs), transported per self pt. Is ambulatory , taken to lobby per self her  picked her up at the front door , no complications.   
IV Doxy ordered and started per order , shortly after pt c/o body aches, headache and feeling loopy' , pt Denies any SOB or difficulty breathing. IV ABX stopped and PIV flushed with no complications , pt starting to feel somewhat better after stopping, Perfect serve sent to MD to notify.   
MD Nav at bedside   
Patient admitted to room 3126 from ED.  Patient oriented to room, call light, bed rails, phone, lights and bathroom.  Patient instructed about the schedule of the day including: vital sign frequency, lab draws, possible tests, frequency of MD and staff rounds, including RN/MD rounding together at bedside, daily weights, and I &O's.  Patient instructed about prescribed diet, how to use 8MENU, and television.    Bed locked, in lowest position, side rails up 2/4, call light within reach.  Will continue to monitor.     
Pt questioning CT results, RN informed pt that they are not yet back, pt okay, PIV running per order with IV ABX, pt appears to be tolerating Zosyn well with no complications noted. Pt resting in bed, call light within reach , denies any additional concerns or questions   
Pt to CT at this time via transport   
Seen 06/21/2024 04:46 AM    CLARITYU Clear 06/21/2024 04:46 AM    LEUKOCYTESUR TRACE 06/21/2024 04:46 AM    UROBILINOGEN 0.2 06/21/2024 04:46 AM    BILIRUBINUR Negative 06/21/2024 04:46 AM    BLOODU LARGE 06/21/2024 04:46 AM    GLUCOSEU Negative 06/21/2024 04:46 AM    KETUA Negative 06/21/2024 04:46 AM     Urine Cultures: No results found for: \"LABURIN\"  Blood Cultures: No results found for: \"BC\"  No results found for: \"BLOODCULT2\"  Organism: No results found for: \"ORG\"    Imaging/Diagnostics Last 24 Hours   XR CHEST PORTABLE    Result Date: 6/19/2024  EXAMINATION: ONE XRAY VIEW OF THE CHEST 6/19/2024 10:54 am COMPARISON: None. HISTORY: ORDERING SYSTEM PROVIDED HISTORY: Shortness of Breath TECHNOLOGIST PROVIDED HISTORY: Reason for exam:->Shortness of Breath Reason for Exam: Shortness of Breath FINDINGS: The lungs are without acute focal process.  There is no effusion or pneumothorax. The cardiomediastinal silhouette is without acute process. The osseous structures are without acute process.     No acute process.         Electronically signed by Cristian Field MD on 6/21/2024 at 1:04 PM

## 2024-06-22 NOTE — PLAN OF CARE
Problem: Discharge Planning  Goal: Discharge to home or other facility with appropriate resources  6/21/2024 2214 by Justus Phan, RN  Outcome: Progressing  Flowsheets (Taken 6/21/2024 2214)  Discharge to home or other facility with appropriate resources:   Identify barriers to discharge with patient and caregiver   Identify discharge learning needs (meds, wound care, etc)   Arrange for needed discharge resources and transportation as appropriate   Refer to discharge planning if patient needs post-hospital services based on physician order or complex needs related to functional status, cognitive ability or social support system  6/21/2024 1019 by Natalie Daly, RN  Outcome: Progressing

## 2024-06-22 NOTE — DISCHARGE SUMMARY
V2.0  Discharge Summary    Name:  Kaia Hickman /Age/Sex: 2000 (23 y.o. female)   Admit Date: 2024  Discharge Date: 24    MRN & CSN:  [id number] & [id number] Encounter Date and Time 24 2:34 PM EDT    Attending:  Cristian Field MD Discharging Provider: Cristian Field MD       Hospital Course:     Patient is a 23-year-old female past medical history of anxiety, depression who was presented to the ED with fever, sore throat/ patient had a recent dog bite. She was seen by general surgery and ID. CT left thigh did not show any abscess. She has been afebrile here. She will be switched to oral antibiotics and discharged back home in stable condition.       Discharge Instruction:     Primary care physician: No primary care provider on file. within 2 weeks  Diet: regular diet   Activity: activity as tolerated  Disposition: Discharged to:   [x]Home, []C, []SNF, []Acute Rehab, []Hospice     Condition on discharge: Stable    Discharge Medications:        Medication List        START taking these medications      amoxicillin-clavulanate 875-125 MG per tablet  Commonly known as: AUGMENTIN  Take 1 tablet by mouth 2 times daily for 7 days            CONTINUE taking these medications      acetaminophen 325 MG tablet  Commonly known as: Tylenol  Take 2 tablets by mouth every 6 hours as needed for Pain     escitalopram 10 MG tablet  Commonly known as: LEXAPRO     ibuprofen 600 MG tablet  Commonly known as: ADVIL;MOTRIN  Take 1 tablet by mouth every 6 hours as needed for Pain     Ventolin  (90 Base) MCG/ACT inhaler  Generic drug: albuterol sulfate HFA               Where to Get Your Medications        These medications were sent to [facility] [id number] [address] - P [phone] - F [phone] [address] [facility] [address]      Phone: [phone]   amoxicillin-clavulanate 875-125 MG per tablet        Objective Findings at Discharge:   BP (!) 97/57   Pulse 84    Temp 98.6 °F (37 °C) (Oral)   Resp 19   Ht 1.626 m (5' 4\")   Wt 77.2 kg (170 lb 3.1 oz)   LMP 06/18/2024 (Approximate)   SpO2 96%   BMI 29.21 kg/m²       Physical Exam:     General: awake, alert, and in NAD  Eyes: EOMI  ENT: neck supple  Cardiovascular: Regular rate and rhythm, normal S1 S2  Respiratory: Clear to auscultation  Gastrointestinal: Soft, non tender, BS+  Genitourinary: no suprapubic tenderness  Musculoskeletal: No edema  Skin: warm, dry  Neuro: Aox3, Cranial nerves grossly intake, nor focal motor or sensory deficit.   Psych: Mood appropriate.         Labs and Imaging   CT FEMUR RIGHT WO CONTRAST    Result Date: 6/21/2024  EXAMINATION: CT OF THE RIGHT FEMUR WITHOUT CONTRAST 6/21/2024 4:46 pm TECHNIQUE: CT of the right femur was performed without the administration of intravenous contrast.  Multiplanar reformatted images are provided for review. Automated exposure control, iterative reconstruction, and/or weight based adjustment of the mA/kV was utilized to reduce the radiation dose to as low as reasonably achievable. COMPARISON: None. HISTORY ORDERING SYSTEM PROVIDED HISTORY: rt thigh pain, dog bite and check for abscess TECHNOLOGIST PROVIDED HISTORY: Reason for exam:->rt thigh pain, dog bite and check for abscess Reason for Exam: rt thigh pain, dog bite and check for abscess FINDINGS: Bones: No evidence of acute fracture or dislocation. No aggressive appearing osseous abnormality or periostitis. Soft Tissue: There is subcutaneous edema in the proximal anterior right thigh.  There is also a small amount of subcutaneous fat stranding in the medial mid right thigh.  No soft tissue gas or fluid collection is identified. There is no unexpected radiopaque foreign body. Joint: No significant degenerative changes. No osseous erosions.     No fluid collection or soft tissue gas.     XR CHEST PORTABLE    Result Date: 6/19/2024  EXAMINATION: ONE XRAY VIEW OF THE CHEST 6/19/2024 10:54 am COMPARISON: None.

## 2024-06-23 LAB — S PYO THROAT QL CULT: NORMAL

## 2024-06-25 LAB
BACTERIA BLD CULT ORG #2: NORMAL
BACTERIA BLD CULT: NORMAL